# Patient Record
Sex: FEMALE | Race: BLACK OR AFRICAN AMERICAN | NOT HISPANIC OR LATINO | URBAN - METROPOLITAN AREA
[De-identification: names, ages, dates, MRNs, and addresses within clinical notes are randomized per-mention and may not be internally consistent; named-entity substitution may affect disease eponyms.]

---

## 2018-01-02 ENCOUNTER — EMERGENCY (EMERGENCY)
Facility: HOSPITAL | Age: 38
LOS: 0 days | Discharge: HOME | End: 2018-01-02
Attending: EMERGENCY MEDICINE
Payer: COMMERCIAL

## 2018-01-02 VITALS
WEIGHT: 199.96 LBS | RESPIRATION RATE: 17 BRPM | HEIGHT: 66 IN | OXYGEN SATURATION: 100 % | DIASTOLIC BLOOD PRESSURE: 53 MMHG | TEMPERATURE: 98 F | SYSTOLIC BLOOD PRESSURE: 136 MMHG | HEART RATE: 86 BPM

## 2018-01-02 DIAGNOSIS — M25.561 PAIN IN RIGHT KNEE: ICD-10-CM

## 2018-01-02 DIAGNOSIS — M54.2 CERVICALGIA: ICD-10-CM
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
## 2018-01-02 DIAGNOSIS — S16.1XXA STRAIN OF MUSCLE, FASCIA AND TENDON AT NECK LEVEL, INITIAL ENCOUNTER: ICD-10-CM

## 2018-01-02 DIAGNOSIS — S80.01XA CONTUSION OF RIGHT KNEE, INITIAL ENCOUNTER: ICD-10-CM
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
## 2018-01-02 PROCEDURE — 99284 EMERGENCY DEPT VISIT MOD MDM: CPT

## 2018-01-02 PROCEDURE — 73562 X-RAY EXAM OF KNEE 3: CPT | Mod: 26,RT

## 2018-01-02 PROCEDURE — 72040 X-RAY EXAM NECK SPINE 2-3 VW: CPT | Mod: 26
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
## 2018-10-12 ENCOUNTER — OUTPATIENT (OUTPATIENT)
Dept: OUTPATIENT SERVICES | Facility: HOSPITAL | Age: 38
LOS: 1 days | End: 2018-10-12

## 2018-10-12 VITALS
OXYGEN SATURATION: 99 % | HEIGHT: 66 IN | WEIGHT: 201.94 LBS | HEART RATE: 73 BPM | TEMPERATURE: 98 F | SYSTOLIC BLOOD PRESSURE: 122 MMHG | DIASTOLIC BLOOD PRESSURE: 78 MMHG | RESPIRATION RATE: 16 BRPM

## 2018-10-12 DIAGNOSIS — Z98.890 OTHER SPECIFIED POSTPROCEDURAL STATES: Chronic | ICD-10-CM

## 2018-10-12 DIAGNOSIS — N87.9 DYSPLASIA OF CERVIX UTERI, UNSPECIFIED: ICD-10-CM

## 2018-10-12 DIAGNOSIS — N87.1 MODERATE CERVICAL DYSPLASIA: ICD-10-CM

## 2018-10-12 LAB
HCT VFR BLD CALC: 40.2 % — SIGNIFICANT CHANGE UP (ref 34.5–45)
HGB BLD-MCNC: 12.9 G/DL — SIGNIFICANT CHANGE UP (ref 11.5–15.5)
MCHC RBC-ENTMCNC: 28.7 PG — SIGNIFICANT CHANGE UP (ref 27–34)
MCHC RBC-ENTMCNC: 32.1 % — SIGNIFICANT CHANGE UP (ref 32–36)
MCV RBC AUTO: 89.3 FL — SIGNIFICANT CHANGE UP (ref 80–100)
NRBC # FLD: 0 — SIGNIFICANT CHANGE UP
PLATELET # BLD AUTO: 281 K/UL — SIGNIFICANT CHANGE UP (ref 150–400)
PMV BLD: 9.7 FL — SIGNIFICANT CHANGE UP (ref 7–13)
RBC # BLD: 4.5 M/UL — SIGNIFICANT CHANGE UP (ref 3.8–5.2)
RBC # FLD: 12.7 % — SIGNIFICANT CHANGE UP (ref 10.3–14.5)
WBC # BLD: 5.53 K/UL — SIGNIFICANT CHANGE UP (ref 3.8–10.5)
WBC # FLD AUTO: 5.53 K/UL — SIGNIFICANT CHANGE UP (ref 3.8–10.5)

## 2018-10-12 RX ORDER — SODIUM CHLORIDE 9 MG/ML
1000 INJECTION, SOLUTION INTRAVENOUS
Qty: 0 | Refills: 0 | Status: DISCONTINUED | OUTPATIENT
Start: 2018-10-29 | End: 2018-11-13

## 2018-10-12 NOTE — H&P PST ADULT - HISTORY OF PRESENT ILLNESS
Pt is a 38 y.o. female ; pt reports h/o HPV x 2 years  ; pt reports recent pap smear ; "abnormality" Leep  pt now presents for Pt is a 38 y.o. female ; pt reports h/o HPV x 2 years  ; pt reports recent pap smear ; "abnormality" s/p Biopsy ;   pt now presents for Leep procedure

## 2018-10-12 NOTE — H&P PST ADULT - PROBLEM SELECTOR PLAN 1
Leep Procedure    Pre op instructions including Pepcid given to pt ; pt appears to have a good understanding of pre op instructions    Cup provided for UCG dos

## 2018-10-12 NOTE — H&P PST ADULT - NSALCOHOLFREQ_GEN_A_CORE_SD
Pt sent in from Urgent Care for abdominal cramps/pain and rectal bleeding started yesterday. Pt reported had an episode of bloody diarrhea yesterday. Pt denies any vomiting.
2-4 times/mo

## 2018-10-28 ENCOUNTER — TRANSCRIPTION ENCOUNTER (OUTPATIENT)
Age: 38
End: 2018-10-28

## 2018-10-29 ENCOUNTER — RESULT REVIEW (OUTPATIENT)
Age: 38
End: 2018-10-29

## 2018-10-29 ENCOUNTER — OUTPATIENT (OUTPATIENT)
Dept: OUTPATIENT SERVICES | Facility: HOSPITAL | Age: 38
LOS: 1 days | Discharge: HOME | End: 2018-10-29
Payer: COMMERCIAL

## 2018-10-29 VITALS
RESPIRATION RATE: 12 BRPM | WEIGHT: 201.94 LBS | TEMPERATURE: 98 F | HEIGHT: 66 IN | DIASTOLIC BLOOD PRESSURE: 76 MMHG | HEART RATE: 74 BPM | SYSTOLIC BLOOD PRESSURE: 126 MMHG | OXYGEN SATURATION: 100 %

## 2018-10-29 VITALS
DIASTOLIC BLOOD PRESSURE: 55 MMHG | OXYGEN SATURATION: 100 % | SYSTOLIC BLOOD PRESSURE: 104 MMHG | RESPIRATION RATE: 14 BRPM | HEART RATE: 56 BPM

## 2018-10-29 DIAGNOSIS — Z98.890 OTHER SPECIFIED POSTPROCEDURAL STATES: Chronic | ICD-10-CM

## 2018-10-29 DIAGNOSIS — N87.1 MODERATE CERVICAL DYSPLASIA: ICD-10-CM

## 2018-10-29 LAB — HCG UR QL: NEGATIVE — SIGNIFICANT CHANGE UP

## 2018-10-29 PROCEDURE — 88305 TISSUE EXAM BY PATHOLOGIST: CPT | Mod: 26

## 2018-10-29 PROCEDURE — 88307 TISSUE EXAM BY PATHOLOGIST: CPT | Mod: 26

## 2018-10-29 RX ORDER — SODIUM CHLORIDE 9 MG/ML
1000 INJECTION, SOLUTION INTRAVENOUS
Qty: 0 | Refills: 0 | Status: DISCONTINUED | OUTPATIENT
Start: 2018-10-29 | End: 2018-11-13

## 2018-10-29 RX ORDER — ASCORBIC ACID 60 MG
1 TABLET,CHEWABLE ORAL
Qty: 0 | Refills: 0 | COMMUNITY

## 2018-10-29 RX ADMIN — SODIUM CHLORIDE 125 MILLILITER(S): 9 INJECTION, SOLUTION INTRAVENOUS at 11:27

## 2018-10-29 NOTE — ASU DISCHARGE PLAN (ADULT/PEDIATRIC). - NURSING INSTRUCTIONS
You were given 1000mg IV Tylenol for pain management.  Please DO NOT take any Tylenol containing products, such as  Vicodin, Percocet, Excedrin, many cold preparations for the next 6 hours (until 320p).  DO NOT EXCEED 3000MG OF TYLENOL OVER 24 HOURS.   You were given Toradol for pain management. Please DO Not take Motrin/Ibuprofen/Advil/Aleve (NSAIDS) for the next 6 hours (Until 330p)

## 2018-10-29 NOTE — BRIEF OPERATIVE NOTE - PROCEDURE
<<-----Click on this checkbox to enter Procedure LEEP (loop electrosurgical excision procedure)  10/29/2018    Active  NBELL

## 2018-10-29 NOTE — ASU DISCHARGE PLAN (ADULT/PEDIATRIC). - ACTIVITY LEVEL
nothing per vagina no tub baths/no douching/no intercourse/no tampons/for two weeks/nothing per vagina

## 2018-11-06 LAB — SURGICAL PATHOLOGY STUDY: SIGNIFICANT CHANGE UP

## 2019-10-04 ENCOUNTER — RESULT REVIEW (OUTPATIENT)
Age: 39
End: 2019-10-04

## 2020-07-09 NOTE — H&P PST ADULT - MOUTH
Addended by: MATTHEW PADRON on: 7/9/2020 03:48 PM     Modules accepted: Orders     normal mouth and gums/moist

## 2020-11-20 ENCOUNTER — RESULT REVIEW (OUTPATIENT)
Age: 40
End: 2020-11-20

## 2020-11-20 PROBLEM — M54.9 DORSALGIA, UNSPECIFIED: Chronic | Status: ACTIVE | Noted: 2018-10-12
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
## 2021-02-04 ENCOUNTER — APPOINTMENT (OUTPATIENT)
Dept: MAMMOGRAPHY | Facility: IMAGING CENTER | Age: 41
End: 2021-02-04
Payer: COMMERCIAL

## 2021-02-04 ENCOUNTER — OUTPATIENT (OUTPATIENT)
Dept: OUTPATIENT SERVICES | Facility: HOSPITAL | Age: 41
LOS: 1 days | End: 2021-02-04
Payer: COMMERCIAL

## 2021-02-04 DIAGNOSIS — Z98.890 OTHER SPECIFIED POSTPROCEDURAL STATES: Chronic | ICD-10-CM

## 2021-02-04 DIAGNOSIS — Z00.8 ENCOUNTER FOR OTHER GENERAL EXAMINATION: ICD-10-CM

## 2021-02-04 PROCEDURE — 77063 BREAST TOMOSYNTHESIS BI: CPT | Mod: 26

## 2021-02-04 PROCEDURE — 77067 SCR MAMMO BI INCL CAD: CPT | Mod: 26

## 2021-02-04 PROCEDURE — 77067 SCR MAMMO BI INCL CAD: CPT

## 2021-02-04 PROCEDURE — 77063 BREAST TOMOSYNTHESIS BI: CPT

## 2021-02-07 ENCOUNTER — TRANSCRIPTION ENCOUNTER (OUTPATIENT)
Age: 41
End: 2021-02-07

## 2021-02-17 ENCOUNTER — APPOINTMENT (OUTPATIENT)
Dept: ULTRASOUND IMAGING | Facility: IMAGING CENTER | Age: 41
End: 2021-02-17
Payer: COMMERCIAL

## 2021-02-17 ENCOUNTER — OUTPATIENT (OUTPATIENT)
Dept: OUTPATIENT SERVICES | Facility: HOSPITAL | Age: 41
LOS: 1 days | End: 2021-02-17
Payer: COMMERCIAL

## 2021-02-17 DIAGNOSIS — Z98.890 OTHER SPECIFIED POSTPROCEDURAL STATES: Chronic | ICD-10-CM

## 2021-02-17 DIAGNOSIS — Z00.8 ENCOUNTER FOR OTHER GENERAL EXAMINATION: ICD-10-CM

## 2021-02-17 PROCEDURE — 76642 ULTRASOUND BREAST LIMITED: CPT | Mod: 26,LT

## 2021-02-17 PROCEDURE — 76642 ULTRASOUND BREAST LIMITED: CPT

## 2021-02-24 ENCOUNTER — OUTPATIENT (OUTPATIENT)
Dept: OUTPATIENT SERVICES | Facility: HOSPITAL | Age: 41
LOS: 1 days | End: 2021-02-24
Payer: COMMERCIAL

## 2021-02-24 ENCOUNTER — APPOINTMENT (OUTPATIENT)
Dept: ULTRASOUND IMAGING | Facility: IMAGING CENTER | Age: 41
End: 2021-02-24
Payer: COMMERCIAL

## 2021-02-24 ENCOUNTER — RESULT REVIEW (OUTPATIENT)
Age: 41
End: 2021-02-24

## 2021-02-24 DIAGNOSIS — Z98.890 OTHER SPECIFIED POSTPROCEDURAL STATES: Chronic | ICD-10-CM

## 2021-02-24 DIAGNOSIS — R92.8 OTHER ABNORMAL AND INCONCLUSIVE FINDINGS ON DIAGNOSTIC IMAGING OF BREAST: ICD-10-CM

## 2021-02-24 PROCEDURE — 77065 DX MAMMO INCL CAD UNI: CPT

## 2021-02-24 PROCEDURE — 88305 TISSUE EXAM BY PATHOLOGIST: CPT

## 2021-02-24 PROCEDURE — 19083 BX BREAST 1ST LESION US IMAG: CPT | Mod: LT

## 2021-02-24 PROCEDURE — 88305 TISSUE EXAM BY PATHOLOGIST: CPT | Mod: 26

## 2021-02-24 PROCEDURE — 77065 DX MAMMO INCL CAD UNI: CPT | Mod: 26,LT

## 2021-02-24 PROCEDURE — A4648: CPT

## 2021-02-24 PROCEDURE — 19083 BX BREAST 1ST LESION US IMAG: CPT

## 2021-02-25 LAB — SURGICAL PATHOLOGY STUDY: SIGNIFICANT CHANGE UP

## 2021-07-21 NOTE — ED ADULT NURSE NOTE - CAS TRG GENERAL AIRWAY, MLM
Patent
49 y/o Para 3 admitted for abdominal supracervical hysterectomy with bilateral salpingectomy. She has a history of myomatous uterus with chronic anemia of blood loss due the myomas. Several other treatment have failed including medications, hysteroscopic resection of submucosal myomas and robotic myomectomy over the last 8 years.  All her options of surgery were extensively discussed and she declined minimally invasive hysterectomy and was animate about preserving her cervix despite all explanations. All her questions were answered and she verbalized understanding. All risks , benefits as well as associated complications of bleeding , blood transfusion, injury to adjacent organs and sequelae. All her questions were answered and she verbalized understanding

## 2021-12-03 ENCOUNTER — RESULT REVIEW (OUTPATIENT)
Age: 41
End: 2021-12-03

## 2022-06-28 NOTE — H&P PST ADULT - LAST CARDIAC ANGIOGRAM/IMAGING
June 28, 2022    To whom it may concern,    Tomeka Aquino is under my medical care. She has some mild abrasions due to there ankle monitor, for which I am recommending some bandages and topical ointments. She reports some discomfort, so an evaluation for adjusting the monitor could be considered as well.    Sincerely,                                Mckenzie Garza M.D.      
denies

## 2022-08-08 ENCOUNTER — OUTPATIENT (OUTPATIENT)
Dept: OUTPATIENT SERVICES | Facility: HOSPITAL | Age: 42
LOS: 1 days | End: 2022-08-08
Payer: COMMERCIAL

## 2022-08-08 ENCOUNTER — APPOINTMENT (OUTPATIENT)
Dept: MAMMOGRAPHY | Facility: IMAGING CENTER | Age: 42
End: 2022-08-08

## 2022-08-08 ENCOUNTER — APPOINTMENT (OUTPATIENT)
Dept: ULTRASOUND IMAGING | Facility: IMAGING CENTER | Age: 42
End: 2022-08-08

## 2022-08-08 DIAGNOSIS — Z98.890 OTHER SPECIFIED POSTPROCEDURAL STATES: Chronic | ICD-10-CM

## 2022-08-08 DIAGNOSIS — Z00.8 ENCOUNTER FOR OTHER GENERAL EXAMINATION: ICD-10-CM

## 2022-08-08 PROCEDURE — 77067 SCR MAMMO BI INCL CAD: CPT | Mod: 26

## 2022-08-08 PROCEDURE — 76641 ULTRASOUND BREAST COMPLETE: CPT | Mod: 26,50

## 2022-08-08 PROCEDURE — 77063 BREAST TOMOSYNTHESIS BI: CPT

## 2022-08-08 PROCEDURE — 77063 BREAST TOMOSYNTHESIS BI: CPT | Mod: 26

## 2022-08-08 PROCEDURE — 76641 ULTRASOUND BREAST COMPLETE: CPT

## 2022-08-08 PROCEDURE — 77067 SCR MAMMO BI INCL CAD: CPT

## 2023-09-26 ENCOUNTER — APPOINTMENT (OUTPATIENT)
Dept: DERMATOLOGY | Facility: CLINIC | Age: 43
End: 2023-09-26
Payer: COMMERCIAL

## 2023-09-26 VITALS — HEIGHT: 65.5 IN | BODY MASS INDEX: 32.92 KG/M2 | WEIGHT: 200 LBS

## 2023-09-26 VITALS — WEIGHT: 5.5 LBS

## 2023-09-26 DIAGNOSIS — D48.5 NEOPLASM OF UNCERTAIN BEHAVIOR OF SKIN: ICD-10-CM

## 2023-09-26 DIAGNOSIS — B35.4 TINEA CORPORIS: ICD-10-CM

## 2023-09-26 DIAGNOSIS — L30.9 DERMATITIS, UNSPECIFIED: ICD-10-CM

## 2023-09-26 PROCEDURE — 99204 OFFICE O/P NEW MOD 45 MIN: CPT | Mod: 25

## 2023-09-26 PROCEDURE — 11102 TANGNTL BX SKIN SINGLE LES: CPT

## 2023-10-06 ENCOUNTER — APPOINTMENT (OUTPATIENT)
Dept: ULTRASOUND IMAGING | Facility: IMAGING CENTER | Age: 43
End: 2023-10-06

## 2023-10-06 ENCOUNTER — APPOINTMENT (OUTPATIENT)
Dept: MAMMOGRAPHY | Facility: IMAGING CENTER | Age: 43
End: 2023-10-06

## 2023-10-13 RX ORDER — GRISEOFULVIN 500 MG/1
500 TABLET ORAL
Qty: 60 | Refills: 1 | Status: ACTIVE | COMMUNITY
Start: 2023-09-26 | End: 1900-01-01

## 2023-10-17 ENCOUNTER — NON-APPOINTMENT (OUTPATIENT)
Age: 43
End: 2023-10-17

## 2023-10-18 RX ORDER — KETOCONAZOLE 20 MG/G
2 CREAM TOPICAL
Qty: 1 | Refills: 3 | Status: ACTIVE | COMMUNITY
Start: 2023-09-26 | End: 1900-01-01

## 2023-10-19 ENCOUNTER — APPOINTMENT (OUTPATIENT)
Dept: DERMATOLOGY | Facility: CLINIC | Age: 43
End: 2023-10-19

## 2023-10-23 LAB — DERMATOLOGY BIOPSY: NORMAL

## 2023-11-09 ENCOUNTER — APPOINTMENT (OUTPATIENT)
Dept: MAMMOGRAPHY | Facility: IMAGING CENTER | Age: 43
End: 2023-11-09
Payer: COMMERCIAL

## 2023-11-09 ENCOUNTER — APPOINTMENT (OUTPATIENT)
Dept: ULTRASOUND IMAGING | Facility: IMAGING CENTER | Age: 43
End: 2023-11-09
Payer: COMMERCIAL

## 2023-11-09 ENCOUNTER — OUTPATIENT (OUTPATIENT)
Dept: OUTPATIENT SERVICES | Facility: HOSPITAL | Age: 43
LOS: 1 days | End: 2023-11-09
Payer: COMMERCIAL

## 2023-11-09 DIAGNOSIS — Z98.890 OTHER SPECIFIED POSTPROCEDURAL STATES: Chronic | ICD-10-CM

## 2023-11-09 DIAGNOSIS — Z00.8 ENCOUNTER FOR OTHER GENERAL EXAMINATION: ICD-10-CM

## 2023-11-09 PROCEDURE — 77063 BREAST TOMOSYNTHESIS BI: CPT | Mod: 26

## 2023-11-09 PROCEDURE — 77067 SCR MAMMO BI INCL CAD: CPT | Mod: 26

## 2023-11-09 PROCEDURE — 77067 SCR MAMMO BI INCL CAD: CPT

## 2023-11-09 PROCEDURE — 77063 BREAST TOMOSYNTHESIS BI: CPT

## 2024-01-18 ENCOUNTER — NON-APPOINTMENT (OUTPATIENT)
Age: 44
End: 2024-01-18

## 2024-01-18 RX ORDER — MOMETASONE FUROATE 1 MG/G
0.1 OINTMENT TOPICAL
Qty: 1 | Refills: 3 | Status: ACTIVE | COMMUNITY
Start: 2024-01-18 | End: 1900-01-01

## 2024-02-14 NOTE — ED ADULT NURSE NOTE - LOCATION
ED PROVIDER NOTE   Provider: Hang Randle DO [address] [facility]   Chief Complaint   Patient presents with    Chest Pain        HISTORY OF PRESENT ILLNESS     Old records reviewed:  Patient's most recent repeat stress echo on 05/03/2021 was normal with a normal ejection fraction    History obtained from patient     History limited by:  Nothing     This patient is a 42year old female with significant past medical history of postpartum cardiomyopathy with resolve heart function presenting to the emergency department with a chief complaint of chest pain. Patient reports having a mid substernal chest pressure dull achy pain which began at 7:00 a.m. Shar Abdalla Patient states that pain has been constant since its onset with occasional increases with sharp stabbing pain that do not appear to be related to any activity or movement. Patient states that she has no associated symptoms including no shortness of breath headache neck pain abdominal pain nausea vomiting diarrhea cough congestion ear pain throat pain palpitations lightheadedness or dizziness. Patient states that her pain is a dull achy 5/10. Patient has not taken any medications prior to arrival.  Patient does not appreciate any modifying factors. Patient's medial record was reviewed though epic. Past medical history, past family history, past surgical history, social history, medications, and allergies, all listed below were reviewed. Triage note and vital signs were reviewed.       PAST MEDICAL HISTORY     Past Medical History:   Diagnosis Date    Anemia     Anxiety     Cardiomyopathy (CMD)     Congestive cardiac failure (CMD)     Essential (primary) hypertension     History of abnormal Pap smear     Hx of abnormal Pap smear     Mental disorder     Anxiety      PAST FAMILY HISTORY   Family History   Problem Relation Age of Onset    Cancer Mother         liver cancer    Hepatitis C Mother     Depression Mother     Hypertension Mother     Psychiatric Mother bipolar    Substance Abuse Mother     Asthma Daughter     Depression Maternal Aunt     Diabetes Maternal Aunt     Heart disease Paternal Uncle     Hypertension Paternal Uncle     Osteoarthritis Maternal Grandmother     Osteoarthritis Maternal Grandfather     Osteoarthritis Paternal Grandmother     Osteoarthritis Paternal Grandfather     Heart disease Paternal Grandfather     Hypertension Paternal Grandfather       PAST SURGICAL HISTORY   Past Surgical History:   Procedure Laterality Date     section, low transverse      Colposcopy,loop electrd cervix excis  2013    Echo heart resting w doppler & color flow  2021      SOCIAL HISTORY   Social History     Socioeconomic History    Marital status: Single     Spouse name: Not on file    Number of children: Not on file    Years of education: Not on file    Highest education level: Not on file   Occupational History    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 Tobacco Use    Smoking status: Former     Current packs/day: 0.50     Average packs/day: 0.5 packs/day for 21.1 years (10.6 ttl pk-yrs)     Types: Cigarettes     Start date: 2003    Smokeless tobacco: Never    Tobacco comments:     Quit on 2023   Vaping Use    Vaping Use: never used   Substance and Sexual Activity    Alcohol use: No     Alcohol/week: 0.0 standard drinks of alcohol    Drug use: No    Sexual activity: Yes     Partners: Male     Birth control/protection: I.U.D.    Other Topics Concern    Not on file   Social History Narrative    Not on file     Social Determinants of Health     Financial Resource Strain: Not on file   Food Insecurity: Not on file   Transportation Needs: Not on file   Physical Activity: Not on file   Stress: Not on file   Social Connections: Not on file   Interpersonal Safety: Not At Risk (2023)    Interpersonal Safety     Social Determinants: Intimate Partner Violence Past Fear: No     Social Determinants: Intimate Partner Violence Current Fear: No Patient lives in [address] [place] [address]  Current Facility-Administered Medications   Medication    sodium chloride 0.9 % flush bag 25 mL    sodium chloride 0.9 % injection 2 mL     Current Outpatient Medications   Medication Sig Dispense Refill    famotidine (Pepcid) 20 MG tablet Take 1 tablet by mouth daily. 30 tablet 0    tamsulosin (Flomax) 0.4 MG Cap Take 1 capsule by mouth daily after a meal. X 4 weeks 30 capsule 0    ALPRAZolam (XANAX) 0.5 MG tablet TAKE 1 TABLET BY MOUTH DAILY AS NEEDED FOR PANIC ATTACKS 20 tablet 0    levonorgestrel (MIRENA) (52 MG) 20 MCG/DAY intrauterine device Mirena IUD NDC 50224-176-12 [facility] 6-4-2020          ALLERGIES   ALLERGIES:  No Known Allergies     REVIEW OF SYSTEMS   Review of Systems   Constitutional:  Negative for activity change, appetite change, chills, diaphoresis, fatigue and fever. HENT:  Negative for congestion, ear pain, nosebleeds, postnasal drip, rhinorrhea, sinus pressure, sinus pain, sore throat, tinnitus, trouble swallowing and voice change. Eyes:  Negative for photophobia, redness and visual disturbance. Respiratory:  Negative for cough, chest tightness, shortness of breath and wheezing. Cardiovascular:  Positive for chest pain. Gastrointestinal:  Negative for abdominal pain, constipation, diarrhea, nausea and vomiting. Genitourinary:  Negative for dysuria, flank pain, frequency, genital sores, hematuria and urgency. Musculoskeletal:  Negative for arthralgias, back pain, gait problem, joint swelling, myalgias, neck pain and neck stiffness. Skin:  Negative for color change, pallor, rash and wound. Neurological:  Negative for dizziness, tremors, seizures, syncope, facial asymmetry, speech difficulty, weakness, light-headedness, numbness and headaches. Hematological:  Negative for adenopathy. Does not bruise/bleed easily. Psychiatric/Behavioral:  Negative for agitation, behavioral problems, confusion and decreased concentration.  The patient is not nervous/anxious. PHYSICAL EXAM   Triage Vitals:   ED Triage Vitals [02/14/24 1233]   ED Triage Vitals Group      Temp 98.3 Â°F (36.8 Â°C)      Heart Rate 94      Resp 17      BP (!) 164/84      SpO2 98 %      EtCO2 mmHg       Height       Weight 278 lb (126.1 kg)      Weight Scale Used       BMI (Calculated)       IBW/kg (Calculated)      Physical Exam  Vitals and nursing note reviewed. Constitutional:       General: She is not in acute distress. Appearance: She is well-developed. She is not ill-appearing, toxic-appearing or diaphoretic. HENT:      Head: Normocephalic and atraumatic. No raccoon eyes, Juarez's sign, abrasion, contusion, right periorbital erythema, left periorbital erythema or laceration. Hair is normal.      Jaw: No trismus. Right Ear: Tympanic membrane, ear canal and external ear normal.      Left Ear: Tympanic membrane, ear canal and external ear normal.      Nose: Nose normal. No nasal deformity, septal deviation, signs of injury, laceration, nasal tenderness, mucosal edema, congestion or rhinorrhea. Mouth/Throat:      Lips: Pink. Mouth: No injury, lacerations, oral lesions or angioedema. Dentition: Normal dentition. Tongue: No lesions. Tongue does not deviate from midline. Palate: No mass and lesions. Pharynx: Oropharynx is clear. Uvula midline. No pharyngeal swelling, oropharyngeal exudate, posterior oropharyngeal erythema or uvula swelling. Tonsils: No tonsillar exudate or tonsillar abscesses. 0 on the right. 0 on the left. Neck: Neck supple. Eyes:      General: No scleral icterus. Right eye: No discharge. Left eye: No discharge. Conjunctiva/sclera: Conjunctivae normal.      Pupils: Pupils are equal, round, and reactive to light. Neck:      Vascular: No JVD. Trachea: Trachea and phonation normal. No tracheal deviation. Meningeal: Brudzinski's sign absent.    Cardiovascular:      Rate and Rhythm: Normal rate. Heart sounds: Normal heart sounds. No murmur heard. No friction rub. No gallop. Pulmonary:      Effort: Pulmonary effort is normal. No accessory muscle usage or respiratory distress. Breath sounds: Normal breath sounds. No stridor. No decreased breath sounds, wheezing, rhonchi or rales. Chest:      Chest wall: No tenderness. Abdominal:      General: Bowel sounds are normal. There is no distension. Palpations: Abdomen is soft. Abdomen is not rigid. There is no mass or pulsatile mass. Tenderness: There is no abdominal tenderness. There is no guarding or rebound. Musculoskeletal:         General: No tenderness. Normal range of motion. Skin:     General: Skin is warm and dry. Coloration: Skin is not pale. Findings: No erythema or rash. Neurological:      Mental Status: She is alert and oriented to person, place, and time. She is not disoriented. Motor: No abnormal muscle tone. Coordination: Coordination normal.   Psychiatric:         Speech: Speech normal.         Behavior: Behavior normal.         Thought Content: Thought content normal.         Judgment: Judgment normal.          DIAGNOSTIC INVESTIGATIONS   Radiology Studies:   Radiology reports reviewed. XR CHEST PA OR AP 1 VIEW   Final Result   IMPRESSION: No acute cardiopulmonary disease. No interval change. Electronically Signed by: Morrell Bosworth, MD   Signed on: 2/14/2024 1:21 PM   Created on Workstation ID: [id number]   Signed on Workstation ID: [id number]           Laboratory Studies:   Labs were ordered and reviewed.    Labs Reviewed   COMPREHENSIVE METABOLIC PANEL - Abnormal; Notable for the following components:       Result Value    Glucose 121 (*)     Albumin 3.2 (*)     Globulin 4.1 (*)     A/G Ratio 0.8 (*)     All other components within normal limits   TROPONIN I, HIGH SENSITIVITY - Normal   TROPONIN I, HIGH SENSITIVITY - Normal   NT PROBNP - Normal   D DIMER, QUANTITATIVE - Normal    Narrative:     Values < 0.50 mg/L(FEU) may be useful to help exclude DVT or PE in patients at low clinical risk for these disorders. For patients above the age of 48, the [employer] college [employer]icians recommends using age adjusted cutoff values. AGE X 0.01 calculates the age adjusted cutoff value in mg/L for these patient populations. MAGNESIUM - Normal   THYROID STIMULATING HORMONE REFLEX - Normal   CBC WITH DIFFERENTIAL    Narrative: The following orders were created for panel order CBC with Automated Differential.                  Procedure                               Abnormality         Status                                     ---------                               -----------         ------                                     CBC with Automated Dif. Nitin Hanna [[id number]]                      Final result                                                 Please view results for these tests on the individual orders. CBC WITH AUTOMATED DIFFERENTIAL (PERFORMABLE ONLY)    Narrative: This is an appended report. These results have been appended to a previously verified report. Monitor and Pulse Ox:   Pulse oximetry is 98.3 % on room air, which is interpreted as normal by me. Patient was placed on cardiac monitor with 97 bpm, no ectopy, interpreted by me. EKG Interpretation    Per my review of the EKG tracing, my findings are listed below. Final interpretation is pending cardiology review.      EKG Interpretation  Rate: 100  Rhythm: normal sinus rhythm   Abnormality: no   compared to previous ECG no changes noted     Data/Interpretation  Encounter Date: 02/14/24   Electrocardiogram 12-Lead   Result Value    Systolic Blood Pressure 409    Diastolic Blood Pressure 84    Ventricular Rate EKG/Min (BPM) 100    Atrial Rate (BPM) 100    CA-Interval (MSEC) 156    QRS-Interval (MSEC) 92    QT-Interval (MSEC) 344    QTc 444    P Axis (Degrees) 48    R Axis (Degrees) 21    T Axis (Degrees) 28    REPORT TEXT      Normal sinus rhythm  Normal ECG  When compared with ECG of  16-JUL-2023 23:27,  No significant change was found        *Final interpretation is pending cardiology review*     ASSESSMENT AND PLAN     MDM/ED course:    Differential Diagnosis is broad and includes but is not limited to: ACS, PE, pleural effusion, pneumonia, chf, gerd, gastritis, PUD, costochondritis    Ro Mercado presented to the ED with the above complaints, Epic chart reviewed for previous medical records and ED visits. Case discussed with primary RN responsible for patient, triage VS were reviewed, a medical screening exam with completed. ED assessments ordered:  Labs, XR, ECG, Cardiac monitor , IV, and Pulse Ox. Clinical Course and Critical Thinking: Cara Romo is a 42year old female who presents with chest pain     Initial evaluation: Patient seen and evaluated by me. Vitals reviewed. Treatment and evaluation plan discussed with patient. Patient agrees with plan and has no concerns at this time.        ED Course as of 02/14/24 1548   Wed Feb 14, 2024   1547 TROPONIN I, HIGH SENSITIVITY:    Troponin I, High Sensitivity 5  Negative x2 [JJ]   1547 D Dimer, Quantitative:    DDIMER, QUANTITATIVE <0.19  Low probability [JJ]   1547 NT proBNP:    NT proBNP 25  Normal [JJ]   1547 CBC with Automated Differential:    WBC 8.2   RBC 4.33   HGB 12.3   HCT 36.5   MCV 84.3   MCH 28.4   MCHC 33.7   RDW-CV 13.4   RDW-SD 41.3      NRBC 0   Neutrophil 63   LYMPH 27   MONO 7   EOSIN 2   BASO 1   Immature Granulocytes 0   Absolute Neutrophil 5.3   Absolute Lymph 2.2   Absolute Mono 0.5   Absolute Eos 0.1   Absolute Baso 0.1   Absolute Immature Granulocytes 0.0  Normal [JJ]   1547 Thyroid Stimulating Hormone Reflex:    TSH 1.507  Normal [JJ]   1547 Magnesium:    MAGNESIUM 1.8  Normal [JJ]   1547 Comprehensive Metabolic Panel(!):    Sodium 138   Potassium 3.6   Chloride 104   CO2 25   ANION GAP 13   Glucose 121(!)   BUN 13   Creatinine 0.66   Glomerular Filtration Rate >90   BUN/CREATININE RATIO 20   CALCIUM 8.6   TOTAL BILIRUBIN 0.2   AST/SGOT 12   ALT/SGPT 22   ALK PHOSPHATASE 74   Albumin 3.2(!)   TOTAL PROTEIN 7.3   GLOBULIN 4.1(!)   A/G Ratio, Serum 0.8(!)  Unremarkable [JJ]   1547 XR CHEST PA OR AP 1 VIEW  No acute process [JJ]      ED Course User Index  [JJ] Elena Lewis ALISE, DO     Interventions:   Medications   sodium chloride 0.9 % flush bag 25 mL (has no administration in time range)   sodium chloride 0.9 % injection 2 mL (has no administration in time range)   alum-mag hydroxide+simethicone/lidocaine viscous (2:1) (MAGIC MOUTHWASH/GI COCKTAIL) (compounded) oral suspension 15 mL (15 mLs Oral Given 2/14/24 1316)   famotidine (PEPCID) tablet 40 mg (40 mg Oral Given 2/14/24 1415)   acetaminophen (TYLENOL) tablet 650 mg (650 mg Oral Given 2/14/24 1415)        Repeat Vitals:   Vitals:    02/14/24 1245 02/14/24 1300 02/14/24 1500 02/14/24 1528   BP: (!) 144/75 134/73 113/71    Pulse: 80 77  (!) 59   Resp:  20  19   Temp:       SpO2: 96% 97% 96% 97%   Weight:            PROCEDURES   Procedures    Cardiac rule out placed. The patient was assessed using the HEART score to help risk-stratifiy their chest pain in the emergency department. Cardiac Risk Factors Assement:     CAD No  Hypertension No  Family history < 65 No  Hypercholesterolemia No  Diabetes No  Tobacco Use No  Obesity BMI>30 Yes  CVA/TIA or PAD No      HEART Score for Major Cardiac Events:   History: Slightly suspicious   EKG Normal   AGE Less than or equal to 45   RISK FACTOR 1-2 risk factors   TROPONIN: Equal or less than normal limit   TOTAL SCORE 1     Additional Information    Low Risk HEART Score Results:   Discussion and Patient Decision:     The patient's HEART Score is considered to be at low risk for a Major Adverse Cardiac Event (MACE). The calculated risk is 1.7 % at 6 weeks.     The results of the work up were discussed with patient including the limitations of a single troponin and single ECG assessment. The patient was offered admission to an ED observation status for serial troponins and serial ECG assessments. After discussing the risks and benefits, the patient agreed to remain in the ED for collection of a repeat troponin and ECG in two hours to decrease their potential risk. MACE is defined as: all-cause mortality, myocardial infarction, or coronary revascularization    Patient had GI cocktail and had significant reduction in her pain from a 5 down to a 1 or 2. Patient was then given Tylenol. 3:48 PM Recheck on patient. Discussed with patient ED findings and plan for discharge. Patient was given ED warnings, discharge instructions, and follow up instructions. Patient understands and agrees with plan for discharge. Patient was informed and verbalizes understanding to return to ER immediately if symptoms worsen, persist, concerns, new symptoms or follow up cannot be obtained. Any questions have been answered. Patient   CLINICAL IMPRESSION   1. Chest pain, unspecified type         DISPOSITION   Discharge    Discharge Medications:      Summary of your Discharge Medications        Take these Medications        Details   famotidine 20 MG tablet  Commonly known as: Pepcid   Take 1 tablet by mouth daily. FOLLOW Up   Leena Sahu MD  [address] [place] [address]  [phone]               Patient is discharged in stable condition. Patient informed to return to the emergency room department immediately if symptoms worsen, persist, concerns, new symptoms, or followup cannot be obtained. The [address] [place] [address] makes medical notes like these available to patients in the interest of transparency. However, be advised this is a medical document. It is intended as peer to peer communication.  It is written in medical language and may contain abbreviations or verbiage that are unfamiliar. It may appear blunt or direct. Medical documents are intended to carry relevant information, facts as evident, and the clinical opinion of the practitioner.     [facility],   02/14/24 4355 knee neck

## 2024-03-29 ENCOUNTER — OFFICE (OUTPATIENT)
Dept: URBAN - METROPOLITAN AREA CLINIC 90 | Facility: CLINIC | Age: 44
Setting detail: OPHTHALMOLOGY
End: 2024-03-29
Payer: COMMERCIAL

## 2024-03-29 DIAGNOSIS — H43.392: ICD-10-CM

## 2024-03-29 DIAGNOSIS — H01.001: ICD-10-CM

## 2024-03-29 DIAGNOSIS — H01.004: ICD-10-CM

## 2024-03-29 DIAGNOSIS — H11.441: ICD-10-CM

## 2024-03-29 DIAGNOSIS — H01.002: ICD-10-CM

## 2024-03-29 DIAGNOSIS — H01.005: ICD-10-CM

## 2024-03-29 PROCEDURE — 92004 COMPRE OPH EXAM NEW PT 1/>: CPT | Performed by: OPHTHALMOLOGY

## 2024-03-29 ASSESSMENT — LID EXAM ASSESSMENTS
OD_BLEPHARITIS: RLL RUL 1+
OS_BLEPHARITIS: LLL LUL 1+

## 2024-08-02 NOTE — H&P PST ADULT - ADMIT DATE
Telephone Call RE:  Lab Reminder      Outcome:     [x] Patient's wife verbalizes understanding    [] Unable to reach   [] Left message              []       Ellen Monge RN      12-Oct-2018

## 2024-11-11 ENCOUNTER — OUTPATIENT (OUTPATIENT)
Dept: OUTPATIENT SERVICES | Facility: HOSPITAL | Age: 44
LOS: 1 days | End: 2024-11-11
Payer: COMMERCIAL

## 2024-11-11 ENCOUNTER — APPOINTMENT (OUTPATIENT)
Dept: MAMMOGRAPHY | Facility: IMAGING CENTER | Age: 44
End: 2024-11-11
Payer: COMMERCIAL

## 2024-11-11 DIAGNOSIS — Z98.890 OTHER SPECIFIED POSTPROCEDURAL STATES: Chronic | ICD-10-CM

## 2024-11-11 DIAGNOSIS — Z00.8 ENCOUNTER FOR OTHER GENERAL EXAMINATION: ICD-10-CM

## 2024-11-11 PROCEDURE — 77063 BREAST TOMOSYNTHESIS BI: CPT

## 2024-11-11 PROCEDURE — 77067 SCR MAMMO BI INCL CAD: CPT

## 2024-11-11 PROCEDURE — 77067 SCR MAMMO BI INCL CAD: CPT | Mod: 26

## 2024-11-11 PROCEDURE — 77063 BREAST TOMOSYNTHESIS BI: CPT | Mod: 26

## 2025-03-11 NOTE — H&P PST ADULT - BMI (KG/M2)
Form completed by DR Gutierrez and faxed to Pedro at [phone] Attn: Nilda Jin. Copy sent to be scanned 2/6/2019  
Who is calling:   [facility]  Reason for Call:   Medical Supplies for the Nutrition, pump bags and peptum(sp)  [facility] is refaxing the order to [phone] please watch for it ans respond as the patient is almost out of the nutrition  Date of last appointment with primary care:   9/21/2018  Has the patient been recently seen:  No  Okay to leave a detailed message: No      
32.6
no